# Patient Record
Sex: FEMALE | Race: WHITE | NOT HISPANIC OR LATINO | URBAN - NONMETROPOLITAN AREA
[De-identification: names, ages, dates, MRNs, and addresses within clinical notes are randomized per-mention and may not be internally consistent; named-entity substitution may affect disease eponyms.]

---

## 2020-10-13 ENCOUNTER — LAB OUTSIDE AN ENCOUNTER (OUTPATIENT)
Dept: URBAN - NONMETROPOLITAN AREA CLINIC 2 | Facility: CLINIC | Age: 65
End: 2020-10-13

## 2020-10-13 ENCOUNTER — DASHBOARD ENCOUNTERS (OUTPATIENT)
Age: 65
End: 2020-10-13

## 2020-10-13 ENCOUNTER — OFFICE VISIT (OUTPATIENT)
Dept: URBAN - NONMETROPOLITAN AREA CLINIC 2 | Facility: CLINIC | Age: 65
End: 2020-10-13
Payer: MEDICARE

## 2020-10-13 DIAGNOSIS — R10.13 EPIGASTRIC ABDOMINAL PAIN: ICD-10-CM

## 2020-10-13 DIAGNOSIS — Z86.010 PERSONAL HISTORY OF COLONIC POLYPS: ICD-10-CM

## 2020-10-13 PROCEDURE — 99244 OFF/OP CNSLTJ NEW/EST MOD 40: CPT | Performed by: NURSE PRACTITIONER

## 2020-10-13 PROCEDURE — 99214 OFFICE O/P EST MOD 30 MIN: CPT | Performed by: NURSE PRACTITIONER

## 2020-10-13 RX ORDER — METFORMIN HYDROCHLORIDE 500 MG/1
TABLET, COATED ORAL
Qty: 0 | Refills: 0 | Status: ACTIVE | COMMUNITY
Start: 2016-10-07

## 2020-10-13 RX ORDER — LISINOPRIL 20 MG/1
TABLET ORAL
Qty: 0 | Refills: 0 | Status: ACTIVE | COMMUNITY
Start: 2016-09-19

## 2020-10-13 RX ORDER — AMLODIPINE BESYLATE 5 MG/1
TABLET ORAL
Qty: 0 | Refills: 0 | Status: ACTIVE | COMMUNITY
Start: 2016-09-12

## 2020-10-13 RX ORDER — PRAVASTATIN SODIUM 20 MG/1
TABLET ORAL
Qty: 0 | Refills: 0 | Status: ACTIVE | COMMUNITY
Start: 2016-10-18

## 2020-10-13 RX ORDER — PANTOPRAZOLE SODIUM 40 MG/1
TAKE 1 TABLET (40 MG) BY ORAL ROUTE ONCE DAILY TABLET, DELAYED RELEASE ORAL ONCE A DAY
Qty: 90 TABLET | Refills: 3 | OUTPATIENT
Start: 2020-10-13

## 2020-10-13 RX ORDER — HYDROCHLOROTHIAZIDE 12.5 MG/1
TABLET ORAL
Qty: 0 | Refills: 0 | Status: ACTIVE | COMMUNITY
Start: 2016-09-19

## 2020-10-13 RX ORDER — SUCRALFATE 1 G/1
1 PO BID BEFORE LUNCH AND BEDTIME NOT W/I 1 HOUR OF OTHER MEDS TABLET ORAL BID
Qty: 180 TABLET | Refills: 3 | OUTPATIENT
Start: 2020-10-13 | End: 2021-10-08

## 2020-10-13 NOTE — HPI-TODAY'S VISIT:
Mrs. Ricci presents for [facility] follow-up.  She states 3 weeks ago she developed epigastric abdominal pain with belching, nausea and reflux.  She is on Nexium 20 mg over-the-counter daily.  She is tried Pepto-Bismol and Kaitlin-Carson with no relief.  She denies any other significant NSAID use.  She is helping planning her daughter's wedding.  She is never had an EGD in the past.  She is status post cholecystectomy.  She denies any vomiting associated with this symptom and or melena.  She states eating does seem to trigger the symptoms.  This has worsened since increasing her glipizide with labile blood sugars. Her lipase was elevated in the 200's. She did not have any imaging. She has cut back on the dose of glipizide and the pain has improved.  Today she is still not feeling well, and is stressed out as her daughter is getting  this Saturday.  MB

## 2020-10-14 ENCOUNTER — TELEPHONE ENCOUNTER (OUTPATIENT)
Dept: URBAN - METROPOLITAN AREA CLINIC 92 | Facility: CLINIC | Age: 65
End: 2020-10-14

## 2020-10-14 ENCOUNTER — LAB OUTSIDE AN ENCOUNTER (OUTPATIENT)
Dept: URBAN - METROPOLITAN AREA CLINIC 92 | Facility: CLINIC | Age: 65
End: 2020-10-14

## 2020-10-14 PROBLEM — 165346000 LABORATORY TEST RESULT ABNORMAL: Status: ACTIVE | Noted: 2020-10-14

## 2020-10-14 PROBLEM — 79922009 EPIGASTRIC PAIN: Status: ACTIVE | Noted: 2020-10-13

## 2020-10-14 LAB
A/G RATIO: 1.9
ALBUMIN: 4.3
ALKALINE PHOSPHATASE: 89
ALT (SGPT): 17
AST (SGOT): 13
BASO (ABSOLUTE): 0.1
BASOS: 1
BILIRUBIN, TOTAL: 0.4
BUN/CREATININE RATIO: 17
BUN: 13
CALCIUM: 9.2
CARBON DIOXIDE, TOTAL: 23
CHLORIDE: 94
CREATININE: 0.77
EGFR IF AFRICN AM: 92
EGFR IF NONAFRICN AM: 80
EOS (ABSOLUTE): 0.4
EOS: 5
GLOBULIN, TOTAL: 2.3
GLUCOSE: 247
HEMATOCRIT: 36.4
HEMATOLOGY COMMENTS:: (no result)
HEMOGLOBIN: 11.9
IMMATURE CELLS: (no result)
IMMATURE GRANS (ABS): 0
IMMATURE GRANULOCYTES: 0
LIPASE: 198
LYMPHS (ABSOLUTE): 0.7
LYMPHS: 10
MCH: 29.5
MCHC: 32.7
MCV: 90
MONOCYTES(ABSOLUTE): 0.5
MONOCYTES: 7
NEUTROPHILS (ABSOLUTE): 5.9
NEUTROPHILS: 77
NRBC: (no result)
PLATELETS: 223
POTASSIUM: 4
PROTEIN, TOTAL: 6.6
RBC: 4.04
RDW: 12.5
SODIUM: 133
WBC: 7.6

## 2020-10-15 ENCOUNTER — WEB ENCOUNTER (OUTPATIENT)
Dept: URBAN - NONMETROPOLITAN AREA CLINIC 2 | Facility: CLINIC | Age: 65
End: 2020-10-15

## 2020-10-20 ENCOUNTER — TELEPHONE ENCOUNTER (OUTPATIENT)
Dept: URBAN - METROPOLITAN AREA CLINIC 92 | Facility: CLINIC | Age: 65
End: 2020-10-20

## 2020-10-20 PROBLEM — 309529002 LUNG MASS: Status: ACTIVE | Noted: 2020-10-20

## 2020-10-20 PROBLEM — 42643001 DISORDER OF URINARY BLADDER: Status: ACTIVE | Noted: 2020-10-20

## 2020-10-28 ENCOUNTER — WEB ENCOUNTER (OUTPATIENT)
Dept: URBAN - NONMETROPOLITAN AREA CLINIC 2 | Facility: CLINIC | Age: 65
End: 2020-10-28

## 2020-11-02 ENCOUNTER — OFFICE VISIT (OUTPATIENT)
Dept: URBAN - NONMETROPOLITAN AREA SURGERY CENTER 1 | Facility: SURGERY CENTER | Age: 65
End: 2020-11-02

## 2020-12-08 ENCOUNTER — OFFICE VISIT (OUTPATIENT)
Dept: URBAN - NONMETROPOLITAN AREA CLINIC 2 | Facility: CLINIC | Age: 65
End: 2020-12-08

## 2021-02-02 ENCOUNTER — OFFICE VISIT (OUTPATIENT)
Dept: URBAN - NONMETROPOLITAN AREA CLINIC 2 | Facility: CLINIC | Age: 66
End: 2021-02-02

## 2021-08-28 ENCOUNTER — TELEPHONE ENCOUNTER (OUTPATIENT)
Dept: URBAN - METROPOLITAN AREA CLINIC 13 | Facility: CLINIC | Age: 66
End: 2021-08-28

## 2021-08-29 ENCOUNTER — TELEPHONE ENCOUNTER (OUTPATIENT)
Dept: URBAN - METROPOLITAN AREA CLINIC 13 | Facility: CLINIC | Age: 66
End: 2021-08-29